# Patient Record
Sex: FEMALE | Race: WHITE | NOT HISPANIC OR LATINO | Employment: FULL TIME | URBAN - METROPOLITAN AREA
[De-identification: names, ages, dates, MRNs, and addresses within clinical notes are randomized per-mention and may not be internally consistent; named-entity substitution may affect disease eponyms.]

---

## 2023-05-01 DIAGNOSIS — E11.65 TYPE 2 DIABETES MELLITUS WITH HYPERGLYCEMIA (MULTI): ICD-10-CM

## 2023-05-01 RX ORDER — METFORMIN HYDROCHLORIDE 1000 MG/1
TABLET ORAL
Qty: 60 TABLET | Refills: 3 | Status: SHIPPED | OUTPATIENT
Start: 2023-05-01 | End: 2024-02-07 | Stop reason: SINTOL

## 2024-02-03 ENCOUNTER — LAB (OUTPATIENT)
Dept: LAB | Facility: LAB | Age: 59
End: 2024-02-03
Payer: COMMERCIAL

## 2024-02-03 DIAGNOSIS — E11.9: Primary | ICD-10-CM

## 2024-02-03 DIAGNOSIS — E11.65 TYPE 2 DIABETES MELLITUS WITH HYPERGLYCEMIA (MULTI): ICD-10-CM

## 2024-02-03 LAB
25(OH)D3 SERPL-MCNC: 33 NG/ML (ref 30–100)
C PEPTIDE SERPL-MCNC: 1.5 NG/ML (ref 0.7–3.9)
CHOLEST SERPL-MCNC: 199 MG/DL (ref 0–199)
CHOLESTEROL/HDL RATIO: 2.8
EST. AVERAGE GLUCOSE BLD GHB EST-MCNC: 128 MG/DL
HBA1C MFR BLD: 6.1 %
HDLC SERPL-MCNC: 71.8 MG/DL
LDLC SERPL CALC-MCNC: 110 MG/DL
NON HDL CHOLESTEROL: 127 MG/DL (ref 0–149)
TRIGL SERPL-MCNC: 84 MG/DL (ref 0–149)
TSH SERPL-ACNC: 2.39 MIU/L (ref 0.44–3.98)
VLDL: 17 MG/DL (ref 0–40)

## 2024-02-03 PROCEDURE — 83036 HEMOGLOBIN GLYCOSYLATED A1C: CPT

## 2024-02-03 PROCEDURE — 82306 VITAMIN D 25 HYDROXY: CPT

## 2024-02-03 PROCEDURE — 84681 ASSAY OF C-PEPTIDE: CPT

## 2024-02-03 PROCEDURE — 84443 ASSAY THYROID STIM HORMONE: CPT

## 2024-02-03 PROCEDURE — 80061 LIPID PANEL: CPT

## 2024-02-03 PROCEDURE — 36415 COLL VENOUS BLD VENIPUNCTURE: CPT

## 2024-02-06 NOTE — PROGRESS NOTES
"Subjective   Keisha Hawkins is a 58 y.o. female presents today for a follow up of DM Type 2.  Initial diagnosis with diabetes was March 2022.. History of gestational about 16 years ago and was on insulin with pregnancy. The patient has a family history paternal grandmother (diagnosed age 35, on insulin, thin) but unsure if Type 1 or 2, aunts and uncles  Known complications include: none    Previously seeing PCP for diabetes care.   Last visit with Dr. Figueredo in 10/2022 and me 8/2023  A1c 6.1% on 2/3/2024.  Previous A1c 5.8% in 8/2023.  DAVID (-) 5/28/2022 c peptide 1.1 in 5/2022, 1.5 in 2/2024  Since last visit, she is doing well on ozempic   Diarrhea/GI side effects has gone away with stopping metformin  She is feeling great   Weight is down 20 lbs over the last year     Historical meds:   Mounjaro- diarrhea   Metformin- stopped due to     Current diabetes regimen is as follows:   farxiga 5 mg once daily   Ozempic 0.5 mg once weekly     The patient is currently checking the blood glucose intermittently   Did not bring her meter today     Hypoglycemia frequency: denies  Hypoglycemia awareness: yes     Regarding symptoms of hyperglycemia, the patient is not experiencing any symptoms such as polyuria, polydipsia, nocturia or rapid weight loss or blurry vision. The patient comes into the office today without concerns related to diabetes.  She is having some tailbone pain and has a follow up with PCP to discuss.          ROS  General: no fever, chills or acute changes in weight in the last 6 months  Skin: no rashes, pruritis or dry skin  Cardiac: denies chest pain, heart palpitations or orthopnea  Pulmonary: denies wheezing, productive cough or exertional dyspnea      Objective    Physical Exam  Blood pressure 122/84, pulse 73, temperature 36.1 °C (96.9 °F), resp. rate 16, height 1.778 m (5' 10\"), weight 76.1 kg (167 lb 12.8 oz).  General: not in acute distress, cooperative   Respiratory: normal respiratory " effort  Musculoskeletal: normal gait       Current Outpatient Medications:     blood sugar diagnostic (OneTouch Verio test strips) strip, twice a day., Disp: , Rfl:     EPINEPHrine (EpiPen 2-Ha) 0.3 mg/0.3 mL injection syringe, Inject 0.3 mL (0.3 mg) into the muscle 1 time if needed., Disp: , Rfl:     Farxiga 5 mg, Take 1 tablet (5 mg) by mouth once daily in the morning. Take before meals., Disp: , Rfl:     Ozempic 0.25 mg or 0.5 mg (2 mg/3 mL) pen injector, INJECT 0.5 MG WEEKLY AS DIRECTED, Disp: , Rfl:     metFORMIN (Glucophage) 1,000 mg tablet, TAKE 1 TABLET BY MOUTH TWICE A DAY (Patient not taking: Reported on 2/7/2024), Disp: 60 tablet, Rfl: 3    Assessment/Plan   Type 2 diabetes with hyperglycemia without long term use insuliN:   History of gestational diabetes:  Hyperlipidemia:   - A1c at goal.  Remains at goal without metformin.  Gi side effects have resolved since stopping metformin.  Will continue GLP1 and SGLT2 for kidney and heart protection.  She is having some constipation with ozempic.  Discussed adding fiber supplement or prunes.  C peptide on low end of normal but still making insulin.  Discussed elevated LDL and still low risk.  She does not want to start statin.  Will continue to monitor.      Plan:   Continue farxiga 5 mg once daily   Continue ozempic 0.5 mg once weekly   Try benefiber gummies.  Add fiber to diet with water, fruits, green veggies    Consider Vit D 3 2000 international units one daily over the counter    Follow up in 6 months- 1 year

## 2024-02-07 ENCOUNTER — OFFICE VISIT (OUTPATIENT)
Dept: PRIMARY CARE | Facility: CLINIC | Age: 59
End: 2024-02-07
Payer: COMMERCIAL

## 2024-02-07 ENCOUNTER — OFFICE VISIT (OUTPATIENT)
Dept: ENDOCRINOLOGY | Facility: CLINIC | Age: 59
End: 2024-02-07
Payer: COMMERCIAL

## 2024-02-07 ENCOUNTER — HOSPITAL ENCOUNTER (OUTPATIENT)
Dept: RADIOLOGY | Facility: CLINIC | Age: 59
Discharge: HOME | End: 2024-02-07
Payer: COMMERCIAL

## 2024-02-07 VITALS
WEIGHT: 167.8 LBS | HEART RATE: 73 BPM | SYSTOLIC BLOOD PRESSURE: 122 MMHG | BODY MASS INDEX: 24.02 KG/M2 | DIASTOLIC BLOOD PRESSURE: 84 MMHG | HEIGHT: 70 IN | TEMPERATURE: 96.9 F | RESPIRATION RATE: 16 BRPM

## 2024-02-07 VITALS
WEIGHT: 170.6 LBS | TEMPERATURE: 97.7 F | HEART RATE: 53 BPM | DIASTOLIC BLOOD PRESSURE: 70 MMHG | OXYGEN SATURATION: 98 % | SYSTOLIC BLOOD PRESSURE: 100 MMHG | BODY MASS INDEX: 24.48 KG/M2

## 2024-02-07 DIAGNOSIS — M53.3 PAIN IN THE COCCYX: ICD-10-CM

## 2024-02-07 DIAGNOSIS — M53.3 PAIN IN THE COCCYX: Primary | ICD-10-CM

## 2024-02-07 DIAGNOSIS — E78.5 HYPERLIPIDEMIA, UNSPECIFIED HYPERLIPIDEMIA TYPE: ICD-10-CM

## 2024-02-07 DIAGNOSIS — E11.65 TYPE 2 DIABETES MELLITUS WITH HYPERGLYCEMIA, WITHOUT LONG-TERM CURRENT USE OF INSULIN (MULTI): Primary | ICD-10-CM

## 2024-02-07 DIAGNOSIS — Z86.32 HISTORY OF GESTATIONAL DIABETES: ICD-10-CM

## 2024-02-07 PROCEDURE — 3074F SYST BP LT 130 MM HG: CPT | Performed by: NURSE PRACTITIONER

## 2024-02-07 PROCEDURE — 72100 X-RAY EXAM L-S SPINE 2/3 VWS: CPT | Performed by: RADIOLOGY

## 2024-02-07 PROCEDURE — 1036F TOBACCO NON-USER: CPT | Performed by: FAMILY MEDICINE

## 2024-02-07 PROCEDURE — 3044F HG A1C LEVEL LT 7.0%: CPT | Performed by: FAMILY MEDICINE

## 2024-02-07 PROCEDURE — 3049F LDL-C 100-129 MG/DL: CPT | Performed by: NURSE PRACTITIONER

## 2024-02-07 PROCEDURE — 99213 OFFICE O/P EST LOW 20 MIN: CPT | Performed by: FAMILY MEDICINE

## 2024-02-07 PROCEDURE — 3074F SYST BP LT 130 MM HG: CPT | Performed by: FAMILY MEDICINE

## 2024-02-07 PROCEDURE — 3078F DIAST BP <80 MM HG: CPT | Performed by: FAMILY MEDICINE

## 2024-02-07 PROCEDURE — 3049F LDL-C 100-129 MG/DL: CPT | Performed by: FAMILY MEDICINE

## 2024-02-07 PROCEDURE — 3079F DIAST BP 80-89 MM HG: CPT | Performed by: NURSE PRACTITIONER

## 2024-02-07 PROCEDURE — 99213 OFFICE O/P EST LOW 20 MIN: CPT | Performed by: NURSE PRACTITIONER

## 2024-02-07 PROCEDURE — 3044F HG A1C LEVEL LT 7.0%: CPT | Performed by: NURSE PRACTITIONER

## 2024-02-07 PROCEDURE — 1036F TOBACCO NON-USER: CPT | Performed by: NURSE PRACTITIONER

## 2024-02-07 PROCEDURE — 72100 X-RAY EXAM L-S SPINE 2/3 VWS: CPT

## 2024-02-07 RX ORDER — BLOOD-GLUCOSE METER
EACH MISCELLANEOUS 2 TIMES DAILY
COMMUNITY
Start: 2022-03-11

## 2024-02-07 RX ORDER — SEMAGLUTIDE 0.68 MG/ML
INJECTION, SOLUTION SUBCUTANEOUS
COMMUNITY
End: 2024-04-08

## 2024-02-07 RX ORDER — EPINEPHRINE 0.3 MG/.3ML
1 INJECTION INTRAMUSCULAR ONCE AS NEEDED
COMMUNITY
Start: 2015-08-20

## 2024-02-07 RX ORDER — DAPAGLIFLOZIN 5 MG/1
5 TABLET, FILM COATED ORAL
COMMUNITY

## 2024-02-07 ASSESSMENT — PAIN SCALES - GENERAL
PAINLEVEL: 0-NO PAIN
PAINLEVEL: 8

## 2024-02-07 ASSESSMENT — ENCOUNTER SYMPTOMS
DEPRESSION: 0
LOSS OF SENSATION IN FEET: 0
OCCASIONAL FEELINGS OF UNSTEADINESS: 0

## 2024-02-07 NOTE — PATIENT INSTRUCTIONS
Continue farxiga 5 mg once daily     Continue ozempic 0.5 mg once weekly     Try benefiber gummies.  Add fiber to diet with water, fruits, green veggies      Consider Vit D 3 2000 international units one daily over the counter      Follow up in 6 months- 1 year

## 2024-02-07 NOTE — PROGRESS NOTES
Subjective   Patient ID: Keisha Hawkins is a 58 y.o. female who presents for Tailbone Pain.  Patient here with complaints  of tailbone pain for the past couple months.  No  known trauma.  Exercises regularly.  Can be  pretty bad, and has woken her at night.  Hurts  to sit  on the area.  Got a special pillow to sit on, but it doesn't always help.  Runs regularly  No  back pain.  Using  Tylenol and advil as needed.        Review of Systems    Objective   /70 (BP Location: Right arm, Patient Position: Sitting, BP Cuff Size: Adult)   Pulse 53   Temp 36.5 °C (97.7 °F) (Oral)   Wt 77.4 kg (170 lb 9.6 oz)   SpO2 98%   BMI 24.48 kg/m²     Physical Exam  Vitals reviewed.   Constitutional:       Appearance: Normal appearance.   Musculoskeletal:      Lumbar back: Tenderness (over  lumbar spine and tailbone) present. No swelling, deformity or spasms. Negative right straight leg raise test and negative left straight leg raise test.   Neurological:      Mental Status: She is alert.           Assessment/Plan   Problem List Items Addressed This Visit    None  Visit Diagnoses       Pain in the coccyx    -  Primary    Relevant Orders    XR lumbar spine 2-3 views

## 2024-02-08 NOTE — PATIENT INSTRUCTIONS
Pain in tailbone, with tenderness of lower back as  well.  Check  X-Ray.  If no concerning abnormality, discussed  trying a steroid pack to reduce  inflammation  and see if that  settles  it down.  Can continue  acetaminophen, ibuprofen  as needed.  PT may also be tried.

## 2024-02-11 DIAGNOSIS — M53.3 PAIN IN THE COCCYX: Primary | ICD-10-CM

## 2024-02-11 RX ORDER — METHYLPREDNISOLONE 4 MG/1
TABLET ORAL
Qty: 21 TABLET | Refills: 0 | Status: SHIPPED | OUTPATIENT
Start: 2024-02-11

## 2024-03-18 DIAGNOSIS — Z12.39 BREAST SCREENING: Primary | ICD-10-CM

## 2024-03-26 ENCOUNTER — HOSPITAL ENCOUNTER (OUTPATIENT)
Dept: RADIOLOGY | Facility: CLINIC | Age: 59
Discharge: HOME | End: 2024-03-26
Payer: COMMERCIAL

## 2024-03-26 VITALS — HEIGHT: 70 IN | BODY MASS INDEX: 24.43 KG/M2 | WEIGHT: 170.64 LBS

## 2024-03-26 DIAGNOSIS — Z12.39 BREAST SCREENING: ICD-10-CM

## 2024-03-26 PROCEDURE — 77067 SCR MAMMO BI INCL CAD: CPT | Performed by: STUDENT IN AN ORGANIZED HEALTH CARE EDUCATION/TRAINING PROGRAM

## 2024-03-26 PROCEDURE — 77067 SCR MAMMO BI INCL CAD: CPT

## 2024-03-26 PROCEDURE — 77063 BREAST TOMOSYNTHESIS BI: CPT | Performed by: STUDENT IN AN ORGANIZED HEALTH CARE EDUCATION/TRAINING PROGRAM

## 2024-04-07 DIAGNOSIS — E11.65 TYPE 2 DIABETES MELLITUS WITH HYPERGLYCEMIA, WITHOUT LONG-TERM CURRENT USE OF INSULIN (MULTI): Primary | ICD-10-CM

## 2024-04-08 RX ORDER — SEMAGLUTIDE 0.68 MG/ML
INJECTION, SOLUTION SUBCUTANEOUS
Qty: 3 ML | Refills: 4 | Status: SHIPPED | OUTPATIENT
Start: 2024-04-08

## 2024-08-20 DIAGNOSIS — E11.65 TYPE 2 DIABETES MELLITUS WITH HYPERGLYCEMIA, WITHOUT LONG-TERM CURRENT USE OF INSULIN (MULTI): Primary | ICD-10-CM

## 2024-08-20 RX ORDER — DAPAGLIFLOZIN 5 MG/1
5 TABLET, FILM COATED ORAL
Qty: 90 TABLET | Refills: 3 | Status: SHIPPED | OUTPATIENT
Start: 2024-08-20

## 2024-08-25 DIAGNOSIS — E11.65 TYPE 2 DIABETES MELLITUS WITH HYPERGLYCEMIA, WITHOUT LONG-TERM CURRENT USE OF INSULIN (MULTI): ICD-10-CM

## 2024-08-26 ENCOUNTER — APPOINTMENT (OUTPATIENT)
Dept: ENDOCRINOLOGY | Facility: CLINIC | Age: 59
End: 2024-08-26
Payer: COMMERCIAL

## 2024-08-26 RX ORDER — SEMAGLUTIDE 0.68 MG/ML
INJECTION, SOLUTION SUBCUTANEOUS
Qty: 3 ML | Refills: 5 | Status: SHIPPED | OUTPATIENT
Start: 2024-08-26

## 2024-09-27 ENCOUNTER — HOSPITAL ENCOUNTER (OUTPATIENT)
Dept: RADIOLOGY | Facility: HOSPITAL | Age: 59
Discharge: HOME | End: 2024-09-27
Payer: COMMERCIAL

## 2024-09-27 ENCOUNTER — OFFICE VISIT (OUTPATIENT)
Dept: ORTHOPEDIC SURGERY | Facility: HOSPITAL | Age: 59
End: 2024-09-27
Payer: COMMERCIAL

## 2024-09-27 ENCOUNTER — PATIENT MESSAGE (OUTPATIENT)
Dept: ENDOCRINOLOGY | Facility: CLINIC | Age: 59
End: 2024-09-27
Payer: COMMERCIAL

## 2024-09-27 DIAGNOSIS — M79.644 PAIN OF RIGHT THUMB: ICD-10-CM

## 2024-09-27 DIAGNOSIS — S63.621A SPRAIN OF INTERPHALANGEAL JOINT OF RIGHT THUMB, INITIAL ENCOUNTER: ICD-10-CM

## 2024-09-27 DIAGNOSIS — M65.311 TRIGGER FINGER OF RIGHT THUMB: Primary | ICD-10-CM

## 2024-09-27 DIAGNOSIS — E11.65 TYPE 2 DIABETES MELLITUS WITH HYPERGLYCEMIA, WITHOUT LONG-TERM CURRENT USE OF INSULIN: Primary | ICD-10-CM

## 2024-09-27 PROCEDURE — 99213 OFFICE O/P EST LOW 20 MIN: CPT | Performed by: PHYSICIAN ASSISTANT

## 2024-09-27 PROCEDURE — L3809 WHFO W/O JOINTS PRE OTS: HCPCS | Performed by: PHYSICIAN ASSISTANT

## 2024-09-27 PROCEDURE — 99203 OFFICE O/P NEW LOW 30 MIN: CPT | Performed by: PHYSICIAN ASSISTANT

## 2024-09-27 PROCEDURE — 3049F LDL-C 100-129 MG/DL: CPT | Performed by: PHYSICIAN ASSISTANT

## 2024-09-27 PROCEDURE — 3044F HG A1C LEVEL LT 7.0%: CPT | Performed by: PHYSICIAN ASSISTANT

## 2024-09-27 PROCEDURE — 73140 X-RAY EXAM OF FINGER(S): CPT | Mod: RT

## 2024-09-27 RX ORDER — MELOXICAM 15 MG/1
15 TABLET ORAL DAILY
Qty: 30 TABLET | Refills: 0 | Status: SHIPPED | OUTPATIENT
Start: 2024-09-27

## 2024-09-27 RX ORDER — METFORMIN HYDROCHLORIDE 500 MG/1
TABLET, EXTENDED RELEASE ORAL
Qty: 180 TABLET | Refills: 1 | Status: SHIPPED | OUTPATIENT
Start: 2024-09-27

## 2024-10-01 NOTE — PROGRESS NOTES
HPI:  Keisha Hawkins is a RHD 58 y.o. female who presents to the [facility] for evaluation of right thumb pain.     Reports a painful and swollen right thumb which started after raking leaves in the yard approx 2 weeks ago. No traumatic injury to the hand. Reports no pain at rest but painful clicking/popping sensation oat the interphalangeal joint. No numbness/tingling. No wrist pain. No hand weakness.    Has been using ibuprofen prn for pain.     ROS:  Reviewed on EMR and patient intake sheet.    PMH/SH:  Reviewed on EMR and patient intake sheet.    Exam:  Hand/Wrist Musculoskeletal Exam    Inspection    Right      Right hand/wrist inspection is normal.      Erythema: none      Ecchymosis: none      Edema: none      Deformity: none      Hand - prior incision: none      Wrist - prior incision: none    Palpation    Right      Triggering: thumb      Thumb tenderness to palpation: interphalangeal joint      Right wrist palpation is normal.    Range of Motion    Right Hand      Thumb interphalangeal: limited       Thumb metacarpal phalangeal joint: full       Thumb carpometacarpal joint: full       Right Wrist      Right wrist range of motion is normal.       Strength    Right Hand      Right hand strength is normal.       Right Wrist      Right wrist strength is normal.       Neurovascular    Right       Right neurovascular exam is normal.    Special Tests    Right      Phalen's: negative      Tinel's - carpal tunnel: negative      Tinel's - cubital tunnel: negative    General      Constitutional: appears stated age and well-nourished    Psychiatric: normal mood and affect and no acute distress    Neurological: alert and oriented x3    Skin: intact        Radiology:     Xrays right thumb done in the office today 09/27/24 personally reviewed, along with the accompanying rad report when available. 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 No fracture or dislocation.       Assessment and Plan:  1. Pain of right  thumb  - XR thumb right MIN 2 views; Future    2. Sprain of interphalangeal joint of right thumb, initial encounter  - Wrist brace    3. Trigger finger of right thumb  - meloxicam (Mobic) 15 mg tablet; Take 1 tablet (15 mg) by mouth once daily.  Dispense: 30 tablet; Refill: 0    I reviewed the xrays in detail with Keisha today and reassured no fracture or acute osseus injury. She was provided script for meloxicam for pain today along with a thumb spica splint to immobilize the thumb for comfort.     Follow up information provided for Dr. Chen, one of our hand specialists.     Patient in agreement to plan of care. Of course Keisha Hawkins is welcome to call at anytime with questions or concerns.     Johnna Rosales PA-C  Department of Orthopaedic Surgery    [address] [place] [address]    Voicemail: [phone]   Appts: [phone]  Fax: [phone]

## 2024-10-23 ENCOUNTER — OFFICE VISIT (OUTPATIENT)
Dept: ORTHOPEDIC SURGERY | Facility: HOSPITAL | Age: 59
End: 2024-10-23
Payer: COMMERCIAL

## 2024-10-23 VITALS — WEIGHT: 170 LBS | BODY MASS INDEX: 24.34 KG/M2 | HEIGHT: 70 IN

## 2024-10-23 DIAGNOSIS — M65.311 TRIGGER FINGER OF RIGHT THUMB: Primary | ICD-10-CM

## 2024-10-23 PROCEDURE — 20550 NJX 1 TENDON SHEATH/LIGAMENT: CPT | Performed by: STUDENT IN AN ORGANIZED HEALTH CARE EDUCATION/TRAINING PROGRAM

## 2024-10-23 PROCEDURE — 99214 OFFICE O/P EST MOD 30 MIN: CPT | Performed by: STUDENT IN AN ORGANIZED HEALTH CARE EDUCATION/TRAINING PROGRAM

## 2024-10-23 ASSESSMENT — PAIN SCALES - GENERAL
PAINLEVEL_OUTOF10: 5 - MODERATE PAIN
PAINLEVEL_OUTOF10: 5 - MODERATE PAIN

## 2024-10-23 ASSESSMENT — PAIN DESCRIPTION - DESCRIPTORS
DESCRIPTORS: ACHING;SORE
DESCRIPTORS: ACHING;SORE

## 2024-10-23 ASSESSMENT — PAIN - FUNCTIONAL ASSESSMENT
PAIN_FUNCTIONAL_ASSESSMENT: 0-10
PAIN_FUNCTIONAL_ASSESSMENT: 0-10

## 2024-10-24 NOTE — PROGRESS NOTES
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   She is presenting as a new patient evaluation for the development of atraumatic right thumb pain. At first she noticed some clicking with flexion and now she is apprehensive to use the thumb ray at all. Denies N/T/P. Denies prior diagnosis or treatment.     PHYSICAL EXAM    Extremities / Musculoskeletal:                  Right Hand:  No gross deformity, no active skin lesions, no open wounds.   No erythema, edema or ecchymosis.   TTP over A1 pulley of the thumb  Palpable clicking with flexor tendon excursion  NTTP at base of thumb  NTTP over 1st dorsal compartment  -Eichoff   Motor intact to R/U/M/AIN/PIN  SILT R/U/M  2+ radial, BCR      IMAGING / LABS / EMGs:    Right thumb XR series obtained on 9/27/24 demonstrating age appropriate degenerative changes at the thumb IP. No signs of fx or dislocation    No past medical history on file.    Medication Documentation Review Audit       Reviewed by Marcos Lazaro LPN (Licensed Nurse) on 10/23/24 at 1608      Medication Order Taking? Sig Documenting Provider Last Dose Status   blood sugar diagnostic (OneTouch Verio test strips) strip [id number] No twice a day. Historical Provider, MD Taking Active   Farxiga 5 mg [id number]  Take 1 tablet (5 mg) by mouth once daily in the morning. Take before meals. RHONDA Mosher  Active   meloxicam (Mobic) 15 mg tablet [id number]  Take 1 tablet (15 mg) by mouth once daily. Johnna Rosales PA-C  Active   metFORMIN XR (Glucophage-XR) 500 mg 24 hr tablet [id number]  Take one tablet with meals twice daily.  Do not crush, chew, or split. RHONDA Mosher  Active   semaglutide (Ozempic) 0.25 mg or 0.5 mg (2 mg/3 mL) pen injector [id number]  INJECT 0.5 MG WEEKLY AS DIRECTED  Suzanna Mak, APRN-CNP  Active                    No Known Allergies    No past surgical history on file.      ASSESSMENT:   Right trigger thumb    We discussed the diagnosis as well as available treatment options. At this point I would recommend attempting an in-office CSI. We discussed the R/B/A. The patient elected to proceed with recommended treatment.     PLAN:   Right thumb A1 pulley CSI today    Procedure:  1cc consisting of equal parts 1% lidocaine without epi and 40mg/mL Kenalog was injected into the thumb flexor sheath at the level of the A1 pulley. This was done under the usual sterile circumstances. The patient tolerated the procedure well.     Follow-up in: Although we do not need scheduled follow-up at this time, I would like to see her back if she has any issues whatsoever   XR at next visit: none      Delfin Chen M.D.    Department of Orthopaedics  Hand/Upper Extremity  Phone: [phone]  Appt. Phone: [phone]

## 2024-12-13 ENCOUNTER — APPOINTMENT (OUTPATIENT)
Dept: RADIOLOGY | Facility: HOSPITAL | Age: 59
End: 2024-12-13
Payer: COMMERCIAL

## 2024-12-13 ENCOUNTER — HOSPITAL ENCOUNTER (EMERGENCY)
Facility: HOSPITAL | Age: 59
Discharge: HOME | End: 2024-12-13
Attending: EMERGENCY MEDICINE
Payer: COMMERCIAL

## 2024-12-13 VITALS
WEIGHT: 170 LBS | TEMPERATURE: 97.2 F | OXYGEN SATURATION: 100 % | HEART RATE: 74 BPM | HEIGHT: 70 IN | DIASTOLIC BLOOD PRESSURE: 84 MMHG | RESPIRATION RATE: 18 BRPM | SYSTOLIC BLOOD PRESSURE: 147 MMHG | BODY MASS INDEX: 24.34 KG/M2

## 2024-12-13 DIAGNOSIS — M25.552 LEFT HIP PAIN: ICD-10-CM
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
## 2024-12-13 DIAGNOSIS — R07.81 RIB PAIN ON LEFT SIDE: ICD-10-CM

## 2024-12-13 LAB
ALBUMIN SERPL BCP-MCNC: 4.2 G/DL (ref 3.4–5)
ALP SERPL-CCNC: 70 U/L (ref 33–110)
ALT SERPL W P-5'-P-CCNC: 21 U/L (ref 7–45)
ANION GAP SERPL CALC-SCNC: 12 MMOL/L (ref 10–20)
AST SERPL W P-5'-P-CCNC: 20 U/L (ref 9–39)
BASOPHILS # BLD AUTO: 0.04 X10*3/UL (ref 0–0.1)
BASOPHILS NFR BLD AUTO: 0.4 %
BILIRUB SERPL-MCNC: 0.7 MG/DL (ref 0–1.2)
BUN SERPL-MCNC: 13 MG/DL (ref 6–23)
CALCIUM SERPL-MCNC: 9.1 MG/DL (ref 8.6–10.3)
CARDIAC TROPONIN I PNL SERPL HS: <3 NG/L (ref 0–13)
CHLORIDE SERPL-SCNC: 102 MMOL/L (ref 98–107)
CO2 SERPL-SCNC: 29 MMOL/L (ref 21–32)
CREAT SERPL-MCNC: 0.65 MG/DL (ref 0.5–1.05)
EGFRCR SERPLBLD CKD-EPI 2021: >90 ML/MIN/1.73M*2
EOSINOPHIL # BLD AUTO: 0.08 X10*3/UL (ref 0–0.7)
EOSINOPHIL NFR BLD AUTO: 0.8 %
ERYTHROCYTE [DISTWIDTH] IN BLOOD BY AUTOMATED COUNT: 12.4 % (ref 11.5–14.5)
GLUCOSE BLD MANUAL STRIP-MCNC: 103 MG/DL (ref 74–99)
GLUCOSE SERPL-MCNC: 104 MG/DL (ref 74–99)
HCT VFR BLD AUTO: 43.5 % (ref 36–46)
HGB BLD-MCNC: 15.3 G/DL (ref 12–16)
IMM GRANULOCYTES # BLD AUTO: 0.03 X10*3/UL (ref 0–0.7)
IMM GRANULOCYTES NFR BLD AUTO: 0.3 % (ref 0–0.9)
LYMPHOCYTES # BLD AUTO: 2.37 X10*3/UL (ref 1.2–4.8)
LYMPHOCYTES NFR BLD AUTO: 22.4 %
MAGNESIUM SERPL-MCNC: 1.91 MG/DL (ref 1.6–2.4)
MCH RBC QN AUTO: 31.4 PG (ref 26–34)
MCHC RBC AUTO-ENTMCNC: 35.2 G/DL (ref 32–36)
MCV RBC AUTO: 89 FL (ref 80–100)
MONOCYTES # BLD AUTO: 0.55 X10*3/UL (ref 0.1–1)
MONOCYTES NFR BLD AUTO: 5.2 %
NEUTROPHILS # BLD AUTO: 7.51 X10*3/UL (ref 1.2–7.7)
NEUTROPHILS NFR BLD AUTO: 70.9 %
NRBC BLD-RTO: 0 /100 WBCS (ref 0–0)
PLATELET # BLD AUTO: 242 X10*3/UL (ref 150–450)
POTASSIUM SERPL-SCNC: 4.3 MMOL/L (ref 3.5–5.3)
PROT SERPL-MCNC: 7 G/DL (ref 6.4–8.2)
RBC # BLD AUTO: 4.88 X10*6/UL (ref 4–5.2)
SODIUM SERPL-SCNC: 139 MMOL/L (ref 136–145)
WBC # BLD AUTO: 10.6 X10*3/UL (ref 4.4–11.3)

## 2024-12-13 PROCEDURE — 71101 X-RAY EXAM UNILAT RIBS/CHEST: CPT | Mod: LEFT SIDE | Performed by: RADIOLOGY

## 2024-12-13 PROCEDURE — 73502 X-RAY EXAM HIP UNI 2-3 VIEWS: CPT | Mod: LEFT SIDE | Performed by: RADIOLOGY

## 2024-12-13 PROCEDURE — 80053 COMPREHEN METABOLIC PANEL: CPT | Performed by: SURGERY

## 2024-12-13 PROCEDURE — 84484 ASSAY OF TROPONIN QUANT: CPT | Performed by: SURGERY

## 2024-12-13 PROCEDURE — 71101 X-RAY EXAM UNILAT RIBS/CHEST: CPT | Mod: LT

## 2024-12-13 PROCEDURE — 85025 COMPLETE CBC W/AUTO DIFF WBC: CPT | Performed by: SURGERY

## 2024-12-13 PROCEDURE — 73502 X-RAY EXAM HIP UNI 2-3 VIEWS: CPT | Mod: LT

## 2024-12-13 PROCEDURE — 82947 ASSAY GLUCOSE BLOOD QUANT: CPT

## 2024-12-13 PROCEDURE — 2500000001 HC RX 250 WO HCPCS SELF ADMINISTERED DRUGS (ALT 637 FOR MEDICARE OP): Performed by: SURGERY

## 2024-12-13 PROCEDURE — 83735 ASSAY OF MAGNESIUM: CPT | Performed by: SURGERY

## 2024-12-13 PROCEDURE — 99284 EMERGENCY DEPT VISIT MOD MDM: CPT | Mod: 25 | Performed by: EMERGENCY MEDICINE

## 2024-12-13 PROCEDURE — 36415 COLL VENOUS BLD VENIPUNCTURE: CPT | Performed by: SURGERY

## 2024-12-13 RX ORDER — LIDOCAINE 50 MG/G
1 PATCH TOPICAL DAILY
Qty: 12 PATCH | Refills: 0 | Status: SHIPPED | OUTPATIENT
Start: 2024-12-13

## 2024-12-13 RX ORDER — ACETAMINOPHEN 500 MG
1000 TABLET ORAL EVERY 6 HOURS PRN
Qty: 30 TABLET | Refills: 0 | Status: SHIPPED | OUTPATIENT
Start: 2024-12-13 | End: 2024-12-23

## 2024-12-13 RX ORDER — ACETAMINOPHEN 325 MG/1
650 TABLET ORAL ONCE
Status: COMPLETED | OUTPATIENT
Start: 2024-12-13 | End: 2024-12-13

## 2024-12-13 RX ORDER — CYCLOBENZAPRINE HCL 10 MG
10 TABLET ORAL 2 TIMES DAILY PRN
Qty: 12 TABLET | Refills: 0 | Status: SHIPPED | OUTPATIENT
Start: 2024-12-13 | End: 2024-12-23

## 2024-12-13 ASSESSMENT — PAIN SCALES - GENERAL
PAINLEVEL_OUTOF10: 2
PAINLEVEL_OUTOF10: 4

## 2024-12-13 ASSESSMENT — COLUMBIA-SUICIDE SEVERITY RATING SCALE - C-SSRS
6. HAVE YOU EVER DONE ANYTHING, STARTED TO DO ANYTHING, OR PREPARED TO DO ANYTHING TO END YOUR LIFE?: NO
1. IN THE PAST MONTH, HAVE YOU WISHED YOU WERE DEAD OR WISHED YOU COULD GO TO SLEEP AND NOT WAKE UP?: NO
2. HAVE YOU ACTUALLY HAD ANY THOUGHTS OF KILLING YOURSELF?: NO

## 2024-12-13 ASSESSMENT — PAIN DESCRIPTION - LOCATION: LOCATION: BACK

## 2024-12-13 ASSESSMENT — PAIN DESCRIPTION - PAIN TYPE: TYPE: ACUTE PAIN

## 2024-12-13 ASSESSMENT — PAIN DESCRIPTION - ORIENTATION: ORIENTATION: LEFT;LOWER

## 2024-12-13 ASSESSMENT — PAIN - FUNCTIONAL ASSESSMENT
PAIN_FUNCTIONAL_ASSESSMENT: 0-10
PAIN_FUNCTIONAL_ASSESSMENT: 0-10

## 2024-12-13 NOTE — ED PROVIDER NOTES
"Chief Complaint   Patient presents with    Fall     HPI:   Keisha Hawkins is an 59 y.o. female with a history of DM presenting to the ED for chief complaint of a fall.  Explains that earlier today she sustained a mechanical fall when she slipped on black ice.  Explains that she felt directly into her left hip and left side of her body.  Explains that the lateral aspect of the left hip took most of the force.  She did not hit her head or lose conscious.  She does not take blood thinners.  She explains that she feels somewhat dizzy and \"out of it\".  Explains that when she fell down the did knocked the wind out of her.  She denies any weakness numbness or tingling.    Medications: Meloxicam, metformin, semaglutide, farxiga  Soc HX:  No Known Allergies:  No past medical history on file.  No past surgical history on file.  Family History   Problem Relation Name Age of Onset    Breast cancer Mother          Physical Exam  Vitals and nursing note reviewed.   Constitutional:       General: She is not in acute distress.     Appearance: She is well-developed.   HENT:      Head: Normocephalic and atraumatic.      Right Ear: Tympanic membrane normal.      Left Ear: Tympanic membrane normal.      Ears:      Comments: No hemotympanums     Nose: Nose normal.      Mouth/Throat:      Mouth: Mucous membranes are moist.      Pharynx: Oropharynx is clear.   Eyes:      Extraocular Movements: Extraocular movements intact.      Conjunctiva/sclera: Conjunctivae normal.      Pupils: Pupils are equal, round, and reactive to light.   Cardiovascular:      Rate and Rhythm: Normal rate and regular rhythm.      Heart sounds: No murmur heard.  Pulmonary:      Effort: Pulmonary effort is normal. No respiratory distress.      Breath sounds: Normal breath sounds.      Comments: No flail chest  Abdominal:      Palpations: Abdomen is soft.      Tenderness: There is no abdominal tenderness. There is no guarding or rebound.   Musculoskeletal:         " General: No swelling.      Cervical back: Neck supple.      Comments: Left leg moves smoothly and freely.  No pain with range of motion of the hip.  The knee is stable.  No distal edema neurovascular intact compartments are soft.     Skin:     General: Skin is warm and dry.      Capillary Refill: Capillary refill takes less than 2 seconds.      Comments: No significant bruising or deformity over the left lateral hip or left ribs.     Neurological:      General: No focal deficit present.      Mental Status: She is alert and oriented to person, place, and time.      Cranial Nerves: Cranial nerves 2-12 are intact.      Comments: NIH is 0   Psychiatric:         Mood and Affect: Mood normal.        VS: As documented in the triage note and EMR flowsheet from this visit were reviewed.      Medical Decision Making: This is a 59-year-old female presenting to the ED after she experienced a mechanical fall when she slipped on the ice earlier today.  Patient explains that she fell onto her left hip and hit the left side of her ribs on the ground.  Explains that it knocked the wind out of her and she feels dizzy afterwards.  She did not hit her head or lose consciousness.  She does not take blood thinners.  I have low suspicion for intracranial pathology.  I did suggest the patient get a CT scan of the head today.  Patient declined states that she does not want to do the scan she would rather monitor her symptoms and return for scan later.  She had no neurological deficits.  Initially point-of-care glucose was 102.  Lab work did not show evidence of leukocytosis.  Her hemoglobin was stable.  Electrolytes were normal.  Kidney and liver function was normal..  Patient was treated with Tylenol in the ED.  She was given a prescription for Flexeril and lidocaine patches for home.  She understands strict return precautions for CT scan.  She is agreeable to outpatient management.  Diagnoses as of 12/13/24 1429   Fall, initial encounter    Left hip pain   Rib pain on left side     Counseling: Spoke with the patient and discussed today´s findings, in addition to providing specific details for the plan of care and expected course.  Patient was given the opportunity to ask questions.    Discussed return precautions and importance of follow-up.  Advised to follow-up with PCP.  I specifically advised to return to the ED for changing or worsening symptoms, new symptoms, complaint specific precautions, and precautions listed on the discharge paperwork.  Educated on the common potential side effects of medications prescribed.    I advised the patient that the emergency evaluation and treatment provided today doesn't end their need for medical care. It is very important that they follow-up with their primary care provider or other specialist as instructed.    The plan of care was mutually agreed upon with the patient. The patient and/or family were given the opportunity to ask questions. All questions asked today in the ED were answered to the best of my ability with today's information.    This report was transcribed using voice recognition software.  Every effort was made to ensure accuracy, however, inadvertently computerized transcription errors may be present.       Benoit Etienne PA-C  12/13/24 1640

## 2024-12-13 NOTE — DISCHARGE INSTRUCTIONS
You have been seen at a [facility].  Please follow-up with your primary care provider in the next 1 to 2 days for further evaluation and routine follow-up.  Please return to the emergency room if having any worsening symptoms.  Please follow-up with any specialists if discussed during your emergency room stay.

## 2024-12-13 NOTE — ED TRIAGE NOTES
Pt c/o slipping on ice AM of 12/13, c/o L hip, lower back pain. Pt denies taking any meds for pain

## 2025-01-07 ENCOUNTER — PATIENT MESSAGE (OUTPATIENT)
Dept: ENDOCRINOLOGY | Facility: CLINIC | Age: 60
End: 2025-01-07
Payer: COMMERCIAL

## 2025-01-07 DIAGNOSIS — E11.65 TYPE 2 DIABETES MELLITUS WITH HYPERGLYCEMIA, WITHOUT LONG-TERM CURRENT USE OF INSULIN: Primary | ICD-10-CM

## 2025-01-11 ENCOUNTER — LAB (OUTPATIENT)
Dept: LAB | Facility: LAB | Age: 60
End: 2025-01-11
Payer: COMMERCIAL

## 2025-01-11 DIAGNOSIS — E11.65 TYPE 2 DIABETES MELLITUS WITH HYPERGLYCEMIA, WITHOUT LONG-TERM CURRENT USE OF INSULIN: ICD-10-CM

## 2025-01-11 LAB
25(OH)D3 SERPL-MCNC: 23 NG/ML (ref 30–100)
ALBUMIN SERPL BCP-MCNC: 4.7 G/DL (ref 3.4–5)
ALP SERPL-CCNC: 70 U/L (ref 33–110)
ALT SERPL W P-5'-P-CCNC: 16 U/L (ref 7–45)
ANION GAP SERPL CALC-SCNC: 12 MMOL/L (ref 10–20)
AST SERPL W P-5'-P-CCNC: 17 U/L (ref 9–39)
BILIRUB SERPL-MCNC: 0.5 MG/DL (ref 0–1.2)
BUN SERPL-MCNC: 18 MG/DL (ref 6–23)
C PEPTIDE SERPL-MCNC: 1.6 NG/ML (ref 0.7–3.9)
CALCIUM SERPL-MCNC: 9.4 MG/DL (ref 8.6–10.6)
CHLORIDE SERPL-SCNC: 101 MMOL/L (ref 98–107)
CHOLEST SERPL-MCNC: 214 MG/DL (ref 0–199)
CHOLESTEROL/HDL RATIO: 2.7
CO2 SERPL-SCNC: 32 MMOL/L (ref 21–32)
CREAT SERPL-MCNC: 0.65 MG/DL (ref 0.5–1.05)
EGFRCR SERPLBLD CKD-EPI 2021: >90 ML/MIN/1.73M*2
EST. AVERAGE GLUCOSE BLD GHB EST-MCNC: 128 MG/DL
GLUCOSE SERPL-MCNC: 139 MG/DL (ref 74–99)
HBA1C MFR BLD: 6.1 %
HDLC SERPL-MCNC: 79.6 MG/DL
LDLC SERPL CALC-MCNC: 118 MG/DL
NON HDL CHOLESTEROL: 134 MG/DL (ref 0–149)
POTASSIUM SERPL-SCNC: 4.9 MMOL/L (ref 3.5–5.3)
PROT SERPL-MCNC: 7.1 G/DL (ref 6.4–8.2)
SODIUM SERPL-SCNC: 140 MMOL/L (ref 136–145)
TRIGL SERPL-MCNC: 83 MG/DL (ref 0–149)
TSH SERPL-ACNC: 3.01 MIU/L (ref 0.44–3.98)
VLDL: 17 MG/DL (ref 0–40)

## 2025-01-11 PROCEDURE — 82306 VITAMIN D 25 HYDROXY: CPT

## 2025-01-11 PROCEDURE — 83036 HEMOGLOBIN GLYCOSYLATED A1C: CPT

## 2025-01-11 PROCEDURE — 80053 COMPREHEN METABOLIC PANEL: CPT

## 2025-01-11 PROCEDURE — 84443 ASSAY THYROID STIM HORMONE: CPT

## 2025-01-11 PROCEDURE — 80061 LIPID PANEL: CPT

## 2025-01-11 PROCEDURE — 84681 ASSAY OF C-PEPTIDE: CPT

## 2025-01-14 ENCOUNTER — APPOINTMENT (OUTPATIENT)
Dept: ENDOCRINOLOGY | Facility: CLINIC | Age: 60
End: 2025-01-14
Payer: COMMERCIAL

## 2025-01-14 VITALS
SYSTOLIC BLOOD PRESSURE: 124 MMHG | WEIGHT: 175 LBS | HEIGHT: 70 IN | BODY MASS INDEX: 25.05 KG/M2 | HEART RATE: 65 BPM | DIASTOLIC BLOOD PRESSURE: 88 MMHG

## 2025-01-14 DIAGNOSIS — E55.9 VITAMIN D DEFICIENCY: ICD-10-CM

## 2025-01-14 DIAGNOSIS — E11.65 TYPE 2 DIABETES MELLITUS WITH HYPERGLYCEMIA, WITHOUT LONG-TERM CURRENT USE OF INSULIN: Primary | ICD-10-CM

## 2025-01-14 DIAGNOSIS — E78.5 HYPERLIPIDEMIA, UNSPECIFIED HYPERLIPIDEMIA TYPE: ICD-10-CM

## 2025-01-14 PROCEDURE — 3044F HG A1C LEVEL LT 7.0%: CPT | Performed by: NURSE PRACTITIONER

## 2025-01-14 PROCEDURE — 99215 OFFICE O/P EST HI 40 MIN: CPT | Performed by: NURSE PRACTITIONER

## 2025-01-14 PROCEDURE — 3079F DIAST BP 80-89 MM HG: CPT | Performed by: NURSE PRACTITIONER

## 2025-01-14 PROCEDURE — 3074F SYST BP LT 130 MM HG: CPT | Performed by: NURSE PRACTITIONER

## 2025-01-14 PROCEDURE — 3008F BODY MASS INDEX DOCD: CPT | Performed by: NURSE PRACTITIONER

## 2025-01-14 PROCEDURE — 3049F LDL-C 100-129 MG/DL: CPT | Performed by: NURSE PRACTITIONER

## 2025-01-14 RX ORDER — SEMAGLUTIDE 1.34 MG/ML
1 INJECTION, SOLUTION SUBCUTANEOUS
Qty: 9 ML | Refills: 3 | Status: SHIPPED | OUTPATIENT
Start: 2025-01-19

## 2025-01-14 RX ORDER — ERGOCALCIFEROL 1.25 MG/1
50000 CAPSULE ORAL WEEKLY
Qty: 12 CAPSULE | Refills: 0 | Status: SHIPPED | OUTPATIENT
Start: 2025-01-14 | End: 2025-04-14

## 2025-01-14 ASSESSMENT — PATIENT HEALTH QUESTIONNAIRE - PHQ9
1. LITTLE INTEREST OR PLEASURE IN DOING THINGS: NOT AT ALL
SUM OF ALL RESPONSES TO PHQ9 QUESTIONS 1 AND 2: 0
2. FEELING DOWN, DEPRESSED OR HOPELESS: NOT AT ALL

## 2025-01-14 ASSESSMENT — ENCOUNTER SYMPTOMS
OCCASIONAL FEELINGS OF UNSTEADINESS: 0
LOSS OF SENSATION IN FEET: 1
DEPRESSION: 0

## 2025-01-14 NOTE — PATIENT INSTRUCTIONS
Start Vit D 50,000 once weekly x 12 weeks  You can start over the counter Vit D 3 4000 3 drops weekly      Change ozempic 1 mg once weekly     Stop metformin     Continue farxiga 5 mg once daily     Intermittently check the blood sugars     Follow up 6 months

## 2025-01-14 NOTE — PROGRESS NOTES
" Subjective   Keisha Hawkins is a 59 y.o. female presents today for a follow up of DM Type 2.  Initial diagnosis with diabetes was March 2022.. History of gestational about 16 years ago and was on insulin with pregnancy. The patient has a family history paternal grandmother (diagnosed age 35, on insulin, thin) but unsure if Type 1 or 2, aunts and uncles  Known complications include: none    Previously seeing PCP for diabetes care.   Last visit with me 2/2024  A1c 6.1% on 1/12/2025.  Previous A1c 6.1% in 2/3/2024.  DAVID (-) 5/28/2022 c peptide 1.1 in 5/2022, 1.5 in 2/2024  Since last visit, she is doing well on ozempic   She is interested in more weight loss and interested in adjusting the ozempic   Reports some laxity in her diet as of recent       Historical meds:   Mounjaro- diarrhea   Metformin- stopped due to     Current diabetes regimen is as follows:   farxiga 5 mg once daily   Ozempic 0.5 mg once weekly     The patient is currently checking the blood glucose intermittently   Did not bring her meter today     Hypoglycemia frequency: denies  Hypoglycemia awareness: yes     The patient comes into the office today with wanting to lose more weight.         ROS  General: no fever, chills or acute changes in weight in the last 6 months  Skin: no rashes, pruritis or dry skin  Cardiac: denies chest pain, heart palpitations or orthopnea  Pulmonary: denies wheezing, productive cough or exertional dyspnea      Objective    Physical Exam  Blood pressure 124/88, pulse 65, height 1.778 m (5' 10\"), weight 79.4 kg (175 lb).  General: not in acute distress, cooperative   Respiratory: normal respiratory effort  Musculoskeletal: normal gait       Current Outpatient Medications:     blood sugar diagnostic (OneTouch Verio test strips) strip, twice a day., Disp: , Rfl:     cyclobenzaprine (Flexeril) 10 mg tablet, Take 1 tablet (10 mg) by mouth 2 times a day as needed for muscle spasms for up to 10 days., Disp: 12 tablet, Rfl: 0    " Farxiga 5 mg, Take 1 tablet (5 mg) by mouth once daily in the morning. Take before meals., Disp: 90 tablet, Rfl: 3    lidocaine (Lidoderm) 5 % patch, Place 1 patch over 12 hours on the skin once daily. Remove & discard patch within 12 hours or as directed by MD., Disp: 12 patch, Rfl: 0    meloxicam (Mobic) 15 mg tablet, Take 1 tablet (15 mg) by mouth once daily., Disp: 30 tablet, Rfl: 0    metFORMIN XR (Glucophage-XR) 500 mg 24 hr tablet, Take one tablet with meals twice daily.  Do not crush, chew, or split., Disp: 180 tablet, Rfl: 1    semaglutide (Ozempic) 0.25 mg or 0.5 mg (2 mg/3 mL) pen injector, INJECT 0.5 MG WEEKLY AS DIRECTED, Disp: 3 mL, Rfl: 5    Assessment/Plan   Type 2 diabetes with hyperglycemia without long term use insuliN:   Hyperlipidemia:   - A1c at goal.  She had messaged me regarding increase blood sugars and restarted metformin.  Today given wanting more weight loss, will increasing ozempic and stop metformin.  She will start checking her sugars again and notify me sooner. Otherwise no change to meds.  LDL not at goal.  She reports laxity in her diet.  Would like to restart.  If still elevated discussed given family history, elevated Ldl and diabetes history, I would recommend statin.  She reports intolerance to statin the past.   May need to consider PCSK9.      Plan:   Change ozempic 1 mg once weekly   Stop metformin   Continue farxiga 5 mg once daily   Intermittently check the blood sugars   Follow up 6 months       Vit D deficiency:   - Vit D low.  Will supplement with prescription Vit D then change to over the counter.  Discussed using Vit D drops over the counter once she completes the high dose.      Plan:   Start Vit D 50,000 once weekly x 12 weeks  You can start over the counter Vit D 3 4000 3 drops weekly

## 2025-05-09 ENCOUNTER — OFFICE VISIT (OUTPATIENT)
Dept: ORTHOPEDIC SURGERY | Facility: CLINIC | Age: 60
End: 2025-05-09
Payer: COMMERCIAL

## 2025-05-09 VITALS — WEIGHT: 175 LBS | BODY MASS INDEX: 25.05 KG/M2 | HEIGHT: 70 IN

## 2025-05-09 DIAGNOSIS — M79.644 PAIN OF RIGHT THUMB: Primary | ICD-10-CM

## 2025-05-09 PROCEDURE — 20550 NJX 1 TENDON SHEATH/LIGAMENT: CPT | Performed by: STUDENT IN AN ORGANIZED HEALTH CARE EDUCATION/TRAINING PROGRAM

## 2025-05-09 PROCEDURE — 99213 OFFICE O/P EST LOW 20 MIN: CPT | Mod: 25 | Performed by: STUDENT IN AN ORGANIZED HEALTH CARE EDUCATION/TRAINING PROGRAM

## 2025-05-14 NOTE — PROGRESS NOTES
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   She is presenting as a new patient evaluation for the development of atraumatic right thumb pain. At first she noticed some clicking with flexion and now she is apprehensive to use the thumb ray at all. Denies N/T/P. Denies prior diagnosis or treatment.     Interval 5/9/2025:  Patient returning with complaints of recurrent right trigger thumb.  Her prior injection completely resolved symptoms for quite a long time.  It is recently recurred    PHYSICAL EXAM    Extremities / Musculoskeletal:                  Right Hand:  No gross deformity, no active skin lesions, no open wounds.   No erythema, edema or ecchymosis.   TTP over A1 pulley of the thumb  Palpable clicking with flexor tendon excursion  NTTP at base of thumb  NTTP over 1st dorsal compartment  -Eichoff   Motor intact to R/U/M/AIN/PIN  SILT R/U/M  2+ radial, BCR      IMAGING / LABS / EMGs:    No updated imaging obtained for todays visit on 5/9/2025      ASSESSMENT:   Right trigger thumb    We discussed the diagnosis as well as available treatment options. At this point I would recommend attempting an in-office CSI. We discussed the R/B/A. The patient elected to proceed with recommended treatment.     5/9/2025:  We again discussed the various treatment options which consist of observation, corticosteroid injection, open A1 pulley release.  We made a shared decision to proceed with a repeat corticosteroid injection today.    PLAN:   Right thumb A1 pulley CSI today    Procedure:  1cc consisting of equal parts 1% lidocaine without epi and 40mg/mL Kenalog was injected into the thumb flexor sheath at the level of the right A1 pulley. This was done under the usual sterile circumstances. The patient tolerated the procedure  well.     Follow-up in: Although we do not need scheduled follow-up at this time, I would like to see her back if she has any issues whatsoever   XR at next visit: none      Delfin Chen M.D.    Department of Orthopaedics  Hand/Upper Extremity  Phone: [phone]  Appt. Phone: [phone]

## 2025-07-03 ENCOUNTER — HOSPITAL ENCOUNTER (OUTPATIENT)
Dept: RADIOLOGY | Facility: CLINIC | Age: 60
Discharge: HOME | End: 2025-07-03
Payer: COMMERCIAL

## 2025-07-03 DIAGNOSIS — Z12.31 ENCOUNTER FOR SCREENING MAMMOGRAM FOR MALIGNANT NEOPLASM OF BREAST: ICD-10-CM

## 2025-07-03 PROCEDURE — 77063 BREAST TOMOSYNTHESIS BI: CPT

## 2025-07-16 LAB
25(OH)D3+25(OH)D2 SERPL-MCNC: 44 NG/ML (ref 30–100)
ALBUMIN SERPL-MCNC: 4.5 G/DL (ref 3.6–5.1)
ALP SERPL-CCNC: 82 U/L (ref 37–153)
ALT SERPL-CCNC: 12 U/L (ref 6–29)
ANION GAP SERPL CALCULATED.4IONS-SCNC: 6 MMOL/L (CALC) (ref 7–17)
AST SERPL-CCNC: 17 U/L (ref 10–35)
BILIRUB SERPL-MCNC: 0.9 MG/DL (ref 0.2–1.2)
BUN SERPL-MCNC: 19 MG/DL (ref 7–25)
CALCIUM SERPL-MCNC: 9.3 MG/DL (ref 8.6–10.4)
CHLORIDE SERPL-SCNC: 103 MMOL/L (ref 98–110)
CHOLEST SERPL-MCNC: 214 MG/DL
CHOLEST/HDLC SERPL: 2.5 (CALC)
CO2 SERPL-SCNC: 28 MMOL/L (ref 20–32)
CREAT SERPL-MCNC: 0.79 MG/DL (ref 0.5–1.03)
EGFRCR SERPLBLD CKD-EPI 2021: 87 ML/MIN/1.73M2
EST. AVERAGE GLUCOSE BLD GHB EST-MCNC: 143 MG/DL
EST. AVERAGE GLUCOSE BLD GHB EST-SCNC: 7.9 MMOL/L
GLUCOSE SERPL-MCNC: 149 MG/DL (ref 65–99)
HBA1C MFR BLD: 6.6 %
HDLC SERPL-MCNC: 86 MG/DL
LDLC SERPL CALC-MCNC: 108 MG/DL (CALC)
NONHDLC SERPL-MCNC: 128 MG/DL (CALC)
POTASSIUM SERPL-SCNC: 4.9 MMOL/L (ref 3.5–5.3)
PROT SERPL-MCNC: 6.9 G/DL (ref 6.1–8.1)
SODIUM SERPL-SCNC: 137 MMOL/L (ref 135–146)
TRIGL SERPL-MCNC: 107 MG/DL

## 2025-07-21 ENCOUNTER — APPOINTMENT (OUTPATIENT)
Dept: ENDOCRINOLOGY | Facility: CLINIC | Age: 60
End: 2025-07-21
Payer: COMMERCIAL

## 2025-07-21 ENCOUNTER — TELEPHONE (OUTPATIENT)
Dept: ENDOCRINOLOGY | Facility: CLINIC | Age: 60
End: 2025-07-21

## 2025-07-21 VITALS
BODY MASS INDEX: 25.93 KG/M2 | DIASTOLIC BLOOD PRESSURE: 85 MMHG | WEIGHT: 181.1 LBS | HEART RATE: 69 BPM | SYSTOLIC BLOOD PRESSURE: 122 MMHG | TEMPERATURE: 96.6 F | HEIGHT: 70 IN

## 2025-07-21 DIAGNOSIS — E11.65 TYPE 2 DIABETES MELLITUS WITH HYPERGLYCEMIA, WITHOUT LONG-TERM CURRENT USE OF INSULIN: Primary | ICD-10-CM

## 2025-07-21 DIAGNOSIS — E55.9 VITAMIN D DEFICIENCY: ICD-10-CM

## 2025-07-21 DIAGNOSIS — E78.5 HYPERLIPIDEMIA, UNSPECIFIED HYPERLIPIDEMIA TYPE: ICD-10-CM

## 2025-07-21 PROCEDURE — 1036F TOBACCO NON-USER: CPT | Performed by: NURSE PRACTITIONER

## 2025-07-21 PROCEDURE — 99214 OFFICE O/P EST MOD 30 MIN: CPT | Performed by: NURSE PRACTITIONER

## 2025-07-21 PROCEDURE — 3074F SYST BP LT 130 MM HG: CPT | Performed by: NURSE PRACTITIONER

## 2025-07-21 PROCEDURE — 3008F BODY MASS INDEX DOCD: CPT | Performed by: NURSE PRACTITIONER

## 2025-07-21 PROCEDURE — 3079F DIAST BP 80-89 MM HG: CPT | Performed by: NURSE PRACTITIONER

## 2025-07-21 RX ORDER — DAPAGLIFLOZIN 5 MG/1
5 TABLET, FILM COATED ORAL
Qty: 90 TABLET | Refills: 3 | Status: SHIPPED | OUTPATIENT
Start: 2025-07-21

## 2025-07-21 ASSESSMENT — PAIN SCALES - GENERAL: PAINLEVEL_OUTOF10: 0-NO PAIN

## 2025-07-21 ASSESSMENT — ENCOUNTER SYMPTOMS
DEPRESSION: 0
OCCASIONAL FEELINGS OF UNSTEADINESS: 0
LOSS OF SENSATION IN FEET: 1

## 2025-07-21 ASSESSMENT — PATIENT HEALTH QUESTIONNAIRE - PHQ9
2. FEELING DOWN, DEPRESSED OR HOPELESS: NOT AT ALL
SUM OF ALL RESPONSES TO PHQ9 QUESTIONS 1 AND 2: 0
2. FEELING DOWN, DEPRESSED OR HOPELESS: NOT AT ALL
1. LITTLE INTEREST OR PLEASURE IN DOING THINGS: NOT AT ALL
SUM OF ALL RESPONSES TO PHQ9 QUESTIONS 1 AND 2: 0
1. LITTLE INTEREST OR PLEASURE IN DOING THINGS: NOT AT ALL

## 2025-07-21 NOTE — PATIENT INSTRUCTIONS
Continue ozempic 1 mg once weekly     Continue farxiga 5 mg once daily     Intermittently check the blood sugars     Follow up 6 months

## 2025-07-21 NOTE — PROGRESS NOTES
" Subjective   Keisha Hawkins is a 59 y.o. female presents today for a follow up of DM Type 2.  Initial diagnosis with diabetes was March 2022.. History of gestational about 16 years ago and was on insulin with pregnancy. The patient has a family history paternal grandmother (diagnosed age 35, on insulin, thin) but unsure if Type 1 or 2, aunts and uncles  Known complications include: none    Previously seeing PCP for diabetes care.   Last visit with me 1/2025  A1c 6.6% in 7/2025.  Previous A1c 6.1% on 1/12/2025.    DAVID (-) 5/28/2022 c peptide 1.1 in 5/2022, 1.5 in 2/2024, 1.6 in 1/2025  Since last visit, she reports feeling more constipated  Sometimes feels bloated  Does note her clothes are not fitting as well  She recently restarted working with a   She is walking in the mornings  She does report some laxity in her diet     Historical meds:   Mounjaro- diarrhea   Metformin- stopped due to     Current diabetes regimen is as follows:   farxiga 5 mg once daily   Ozempic 1 mg once weekly     The patient is currently checking the blood glucose intermittently, not much recently  Did not bring her meter today     Hypoglycemia frequency: Denies   Hypoglycemia awareness: yes     The patient comes into the office today with a higher A1c than previous.          ROS  General: no fever, chills.  Weight is up about 6 lbs since last visit.   Skin: no rashes, pruritis or dry skin  Cardiac: denies chest pain, heart palpitations or orthopnea  Pulmonary: denies wheezing, productive cough or exertional dyspnea      Objective    Physical Exam  Blood pressure 122/85, pulse 69, temperature 35.9 °C (96.6 °F), temperature source Temporal, height 1.778 m (5' 10\"), weight 82.1 kg (181 lb 1.6 oz).  General: not in acute distress, cooperative   Respiratory: normal respiratory effort  Musculoskeletal: normal gait       Current Outpatient Medications:     blood sugar diagnostic (OneTouch Verio test strips) strip, twice a day., " Disp: , Rfl:     cyclobenzaprine (Flexeril) 10 mg tablet, Take 1 tablet (10 mg) by mouth 2 times a day as needed for muscle spasms for up to 10 days., Disp: 12 tablet, Rfl: 0    Farxiga 5 mg, Take 1 tablet (5 mg) by mouth once daily in the morning. Take before meals., Disp: 90 tablet, Rfl: 3    lidocaine (Lidoderm) 5 % patch, Place 1 patch over 12 hours on the skin once daily. Remove & discard patch within 12 hours or as directed by MD., Disp: 12 patch, Rfl: 0    meloxicam (Mobic) 15 mg tablet, Take 1 tablet (15 mg) by mouth once daily., Disp: 30 tablet, Rfl: 0    semaglutide (Ozempic) 1 mg/dose (4 mg/3 mL) pen injector, Inject 1 mg under the skin 1 (one) time per week., Disp: 9 mL, Rfl: 3    Assessment/Plan   Type 2 diabetes with hyperglycemia without long term use insuliN:   Hyperlipidemia:   - A1c at goal despite up from previous.  She was able to stop metformin.  She did go up on ozempic which she is tolerating overall but having some constipation.  Discussed adding in fiber supplement or miralax as needed to stay regular.   LDL not at goal.  She reports laxity in her diet.  Would like to restart.  If still elevated discussed given family history, elevated Ldl and diabetes history, I would recommend statin.  She reports intolerance to statin the past.   May need to consider PCSK9.  Discussed ASCVD risk if overall low and will continue monitor.      Plan:   Continue ozempic 1 mg once weekly   Continue farxiga 5 mg once daily   Intermittently check the blood sugars   Follow up 6 months       Vit D deficiency:   - Vit D back in range.  Doing well with drops.       Plan:   Continue current supplementation

## 2026-02-09 ENCOUNTER — APPOINTMENT (OUTPATIENT)
Dept: ENDOCRINOLOGY | Facility: CLINIC | Age: 61
End: 2026-02-09
Payer: COMMERCIAL